# Patient Record
Sex: FEMALE | Race: WHITE | Employment: PART TIME | URBAN - METROPOLITAN AREA
[De-identification: names, ages, dates, MRNs, and addresses within clinical notes are randomized per-mention and may not be internally consistent; named-entity substitution may affect disease eponyms.]

---

## 2017-01-12 ENCOUNTER — HOSPITAL ENCOUNTER (OUTPATIENT)
Age: 37
Discharge: HOME | End: 2017-01-12
Attending: EMERGENCY MEDICINE
Payer: MEDICAID

## 2017-01-12 VITALS
DIASTOLIC BLOOD PRESSURE: 86 MMHG | RESPIRATION RATE: 19 BRPM | BODY MASS INDEX: 33.13 KG/M2 | OXYGEN SATURATION: 98 % | SYSTOLIC BLOOD PRESSURE: 100 MMHG | TEMPERATURE: 98 F | HEIGHT: 62 IN | HEART RATE: 119 BPM | WEIGHT: 180 LBS

## 2017-01-12 DIAGNOSIS — H66.002 ACUTE SUPPURATIVE OTITIS MEDIA OF LEFT EAR WITHOUT SPONTANEOUS RUPTURE OF TYMPANIC MEMBRANE, RECURRENCE NOT SPECIFIED: Primary | ICD-10-CM

## 2017-01-12 PROCEDURE — 99214 OFFICE O/P EST MOD 30 MIN: CPT

## 2017-01-12 PROCEDURE — 99213 OFFICE O/P EST LOW 20 MIN: CPT

## 2017-01-12 RX ORDER — AMOXICILLIN AND CLAVULANATE POTASSIUM 875; 125 MG/1; MG/1
1 TABLET, FILM COATED ORAL 2 TIMES DAILY
Qty: 20 TABLET | Refills: 0 | Status: SHIPPED | OUTPATIENT
Start: 2017-01-12 | End: 2017-01-22

## 2017-01-12 RX ORDER — PREDNISONE 20 MG/1
40 TABLET ORAL DAILY
Qty: 6 TABLET | Refills: 0 | Status: SHIPPED | OUTPATIENT
Start: 2017-01-12 | End: 2017-01-15

## 2017-01-12 NOTE — ED PROVIDER NOTES
[facility]    History   Patient presents with:  Cough/URI  Ear Problem Pain (neurosensory)    Stated Complaint: cough    HPI    Patient is a 26-year-old female with a history of lupus who is not on immune Hypertension Mother    • Hypertension Paternal Grandmother    • Glaucoma Neg      family h/o   • Macular degeneration Neg      family h/o   • Cancer Paternal Uncle      Leukemia   • Hypertension Father    • Skin cancer Paternal Grandmother          Smoking specified  (primary encounter diagnosis)    Disposition:  Discharge    Follow-up:  Yadira Issa, [address] [facility] [address]  [phone]    Schedule an appointment as soon as possible for a visit in 1 week  For Recheck      Medications Pr

## 2017-01-19 ENCOUNTER — HOSPITAL ENCOUNTER (OUTPATIENT)
Age: 37
Discharge: HOME | End: 2017-01-19
Payer: MEDICAID

## 2017-01-19 VITALS
RESPIRATION RATE: 20 BRPM | SYSTOLIC BLOOD PRESSURE: 117 MMHG | BODY MASS INDEX: 32.2 KG/M2 | TEMPERATURE: 98 F | WEIGHT: 175 LBS | DIASTOLIC BLOOD PRESSURE: 44 MMHG | HEART RATE: 74 BPM | HEIGHT: 62 IN | OXYGEN SATURATION: 100 %

## 2017-01-19 DIAGNOSIS — R42 DIZZINESS: ICD-10-CM

## 2017-01-19 DIAGNOSIS — R42 VERTIGO: Primary | ICD-10-CM

## 2017-01-19 PROCEDURE — 99213 OFFICE O/P EST LOW 20 MIN: CPT

## 2017-01-19 PROCEDURE — 99214 OFFICE O/P EST MOD 30 MIN: CPT

## 2017-01-19 RX ORDER — MECLIZINE HYDROCHLORIDE 25 MG/1
25 TABLET ORAL 3 TIMES DAILY PRN
Qty: 15 TABLET | Refills: 0 | Status: SHIPPED | OUTPATIENT
Start: 2017-01-19 | End: 2017-01-24

## 2017-01-19 RX ORDER — MECLIZINE HYDROCHLORIDE 25 MG/1
25 TABLET ORAL ONCE
Status: COMPLETED | OUTPATIENT
Start: 2017-01-19 | End: 2017-01-19

## 2017-01-19 NOTE — ED NOTES
Patient will be discharged home with prescription for Antivert. Patient instructed to go to ER if symptoms become worse. Patient stable and ambulatory independently at time of discharge.

## 2017-01-19 NOTE — ED PROVIDER NOTES
Patient presents with:  Dizziness      HPI:     Andrew Morris is a 43year old female presents with a chief complaint of dizziness that started when she woke up this morning. She does have a history of fibromyalgia, lupus, and migraines.   She state Grandmother    • Glaucoma Neg      family h/o   • Macular degeneration Neg      family h/o   • Cancer Paternal Uncle      Leukemia   • Hypertension Father    • Skin cancer Paternal Grandmother          ROS:     Positive for stated complaint: dizziness  All Dizziness R42        All results reviewed and discussed with patient. See AVS for detailed discharge instructions for your condition today.     Please follow up with:  Mary Oropeza MD  . [facility] [address] [phone]    Schedule an a

## 2017-01-19 NOTE — ED INITIAL ASSESSMENT (HPI)
Patient woke up approximately 6 am feeling dizzy. Thought it would pass, but she has continued to feel dizzy all day despite eating, drinking, resting.

## 2017-01-24 ENCOUNTER — OFFICE VISIT (OUTPATIENT)
Dept: FAMILY MEDICINE CLINIC | Facility: CLINIC | Age: 37
End: 2017-01-24

## 2017-01-24 VITALS
BODY MASS INDEX: 34 KG/M2 | TEMPERATURE: 98 F | WEIGHT: 187 LBS | HEART RATE: 89 BPM | SYSTOLIC BLOOD PRESSURE: 133 MMHG | DIASTOLIC BLOOD PRESSURE: 80 MMHG

## 2017-01-24 DIAGNOSIS — H81.20 VESTIBULAR NEURITIS, UNSPECIFIED LATERALITY: Primary | ICD-10-CM

## 2017-01-24 DIAGNOSIS — R05.9 COUGH: ICD-10-CM

## 2017-01-24 PROCEDURE — 99212 OFFICE O/P EST SF 10 MIN: CPT | Performed by: FAMILY MEDICINE

## 2017-01-24 PROCEDURE — 99213 OFFICE O/P EST LOW 20 MIN: CPT | Performed by: FAMILY MEDICINE

## 2017-01-24 RX ORDER — METHYLPREDNISOLONE 4 MG/1
TABLET ORAL
Qty: 1 KIT | Refills: 1 | Status: SHIPPED | OUTPATIENT
Start: 2017-01-24 | End: 2017-07-05 | Stop reason: ALTCHOICE

## 2017-01-24 RX ORDER — AMOXICILLIN 875 MG/1
875 TABLET, COATED ORAL
COMMUNITY
Start: 2016-09-15 | End: 2017-01-24 | Stop reason: ALTCHOICE

## 2017-01-24 RX ORDER — AMOXICILLIN AND CLAVULANATE POTASSIUM 875; 125 MG/1; MG/1
1 TABLET, FILM COATED ORAL
COMMUNITY
Start: 2016-04-01 | End: 2017-01-24 | Stop reason: ALTCHOICE

## 2017-01-24 RX ORDER — GUAIFENESIN AND CODEINE PHOSPHATE 100; 10 MG/5ML; MG/5ML
SOLUTION ORAL
COMMUNITY
Start: 2016-09-15 | End: 2017-01-24 | Stop reason: ALTCHOICE

## 2017-01-24 NOTE — PROGRESS NOTES
Blood pressure 133/80, pulse 89, temperature 98 °F (36.7 °C), temperature source Oral, weight 187 lb (84.823 kg), last menstrual period 12/25/2016, currently breastfeeding. PATIENT COMPLAINING OF 5 DAYS OF SEVERE DIZZINESS. CURRENTLY ON MENSES.  NO HEARIN

## 2017-06-29 ENCOUNTER — APPOINTMENT (OUTPATIENT)
Dept: CT IMAGING | Facility: HOSPITAL | Age: 37
End: 2017-06-29
Attending: EMERGENCY MEDICINE
Payer: MEDICAID

## 2017-06-29 ENCOUNTER — HOSPITAL ENCOUNTER (EMERGENCY)
Facility: HOSPITAL | Age: 37
Discharge: HOME | End: 2017-06-29
Attending: EMERGENCY MEDICINE
Payer: MEDICAID

## 2017-06-29 VITALS
HEIGHT: 63 IN | HEART RATE: 75 BPM | WEIGHT: 170 LBS | BODY MASS INDEX: 30.12 KG/M2 | OXYGEN SATURATION: 98 % | TEMPERATURE: 99 F | RESPIRATION RATE: 16 BRPM | DIASTOLIC BLOOD PRESSURE: 74 MMHG | SYSTOLIC BLOOD PRESSURE: 128 MMHG

## 2017-06-29 DIAGNOSIS — G43.809 OTHER TYPE OF NONINTRACTABLE MIGRAINE: Primary | ICD-10-CM

## 2017-06-29 PROCEDURE — 81025 URINE PREGNANCY TEST: CPT

## 2017-06-29 PROCEDURE — 96375 TX/PRO/DX INJ NEW DRUG ADDON: CPT

## 2017-06-29 PROCEDURE — 99284 EMERGENCY DEPT VISIT MOD MDM: CPT

## 2017-06-29 PROCEDURE — 70450 CT HEAD/BRAIN W/O DYE: CPT | Performed by: EMERGENCY MEDICINE

## 2017-06-29 PROCEDURE — 96374 THER/PROPH/DIAG INJ IV PUSH: CPT

## 2017-06-29 RX ORDER — KETOROLAC TROMETHAMINE 15 MG/ML
15 INJECTION, SOLUTION INTRAMUSCULAR; INTRAVENOUS ONCE
Status: COMPLETED | OUTPATIENT
Start: 2017-06-29 | End: 2017-06-29

## 2017-06-29 RX ORDER — METOCLOPRAMIDE HYDROCHLORIDE 5 MG/ML
5 INJECTION INTRAMUSCULAR; INTRAVENOUS ONCE
Status: COMPLETED | OUTPATIENT
Start: 2017-06-29 | End: 2017-06-29

## 2017-06-30 NOTE — ED PROVIDER NOTES
Patient Seen in: [facility] AND [facility] Emergency Department    History   Patient presents with:  Dizziness (neurologic)    Stated Complaint: dizziness/lightheaded, headache    HPI    Patient is a 76-year-old female who presents to the emergency department wi M aunt   • Hypertension Mother    • Hypertension Paternal Grandmother    • Glaucoma Neg      family h/o   • Macular degeneration Neg      family h/o   • Cancer Paternal Uncle      Leukemia   • Hypertension Father    • Skin cancer Paternal Grandmother POCT PREGNANCY URINE - Normal       ============================================================  ED Course  ------------------------------------------------------------  MDM     Patient symptoms improved with IV Toradol and Reglan.   CT scan normal.  Suspe

## 2017-07-05 ENCOUNTER — OFFICE VISIT (OUTPATIENT)
Dept: FAMILY MEDICINE CLINIC | Facility: CLINIC | Age: 37
End: 2017-07-05

## 2017-07-05 VITALS
DIASTOLIC BLOOD PRESSURE: 77 MMHG | HEART RATE: 78 BPM | SYSTOLIC BLOOD PRESSURE: 133 MMHG | WEIGHT: 191 LBS | BODY MASS INDEX: 34 KG/M2 | TEMPERATURE: 98 F

## 2017-07-05 DIAGNOSIS — H57.11 EYE PAIN, RIGHT: Primary | ICD-10-CM

## 2017-07-05 PROCEDURE — 99213 OFFICE O/P EST LOW 20 MIN: CPT | Performed by: FAMILY MEDICINE

## 2017-07-05 PROCEDURE — 99212 OFFICE O/P EST SF 10 MIN: CPT | Performed by: FAMILY MEDICINE

## 2017-07-05 RX ORDER — SERTRALINE HYDROCHLORIDE 100 MG/1
TABLET, FILM COATED ORAL
COMMUNITY
End: 2017-07-05 | Stop reason: ALTCHOICE

## 2017-07-05 NOTE — PROGRESS NOTES
Blood pressure 133/77, pulse 78, temperature 98.4 °F (36.9 °C), temperature source Oral, weight 191 lb (86.6 kg), last menstrual period 06/22/2017, currently breastfeeding. Patient presents today following up for headache that began 9 days ago.   Has a h

## 2017-07-06 ENCOUNTER — TELEPHONE (OUTPATIENT)
Dept: FAMILY MEDICINE CLINIC | Facility: CLINIC | Age: 37
End: 2017-07-06

## 2017-07-06 ENCOUNTER — OFFICE VISIT (OUTPATIENT)
Dept: OPHTHALMOLOGY | Facility: CLINIC | Age: 37
End: 2017-07-06

## 2017-07-06 DIAGNOSIS — H57.11 PAIN IN RIGHT EYE: Primary | ICD-10-CM

## 2017-07-06 DIAGNOSIS — R51.9 NONINTRACTABLE EPISODIC HEADACHE, UNSPECIFIED HEADACHE TYPE: Primary | ICD-10-CM

## 2017-07-06 PROCEDURE — 99243 OFF/OP CNSLTJ NEW/EST LOW 30: CPT | Performed by: OPHTHALMOLOGY

## 2017-07-06 PROCEDURE — 99212 OFFICE O/P EST SF 10 MIN: CPT | Performed by: OPHTHALMOLOGY

## 2017-07-06 NOTE — TELEPHONE ENCOUNTER
Patient following up with doctor after seeing ophthalmologist.   No findings, everything clear no inflammation or infection. Still having pain and pressure right eye.-  Suggested patient f/u with neurology.   Patient asking for call back

## 2017-07-06 NOTE — PROGRESS NOTES
Ta Matthew is a 42year old female. HPI:     HPI     Consult    Additional comments: Referred by David Root MD           Comments   Patient is here for an evaluation of eye pain and pressure behind the right eye.   Patient was seen at [facility] E use: No                Medications:    No current outpatient prescriptions on file.     Allergies:    Latex                   Rash    Comment:Difficulty breathing  Sulfa Antibiotics           ROS:     ROS     Positive for: Eyes    Negative for: Constitution in the eyes. Refer patient back to Dr. Zacarias Gao and possible referral to neurologist if symptoms persist.    Patient had a negative CT scan of the brain. No orders of the defined types were placed in this encounter.       Meds This Visit:    No pre

## 2017-07-06 NOTE — PATIENT INSTRUCTIONS
Pain in right eye   No cause found for right eye pain. Reassured patient that there is no infection, inflammation, foreign body, abrasion or increased pressure in the eyes.    Refer patient back to Dr. Enedina Beal and possible referral to neurologist if sympt

## 2017-08-07 ENCOUNTER — HOSPITAL ENCOUNTER (OUTPATIENT)
Facility: HOSPITAL | Age: 37
Setting detail: OBSERVATION
Discharge: HOME | End: 2017-08-08
Attending: EMERGENCY MEDICINE | Admitting: HOSPITALIST
Payer: COMMERCIAL

## 2017-08-07 ENCOUNTER — TELEPHONE (OUTPATIENT)
Dept: INTERNAL MEDICINE CLINIC | Facility: CLINIC | Age: 37
End: 2017-08-07

## 2017-08-07 DIAGNOSIS — K92.2 GI BLEED: ICD-10-CM

## 2017-08-07 DIAGNOSIS — K92.2 GASTROINTESTINAL HEMORRHAGE, UNSPECIFIED GASTROINTESTINAL HEMORRHAGE TYPE: ICD-10-CM

## 2017-08-07 DIAGNOSIS — K20.90 ESOPHAGITIS: ICD-10-CM

## 2017-08-07 DIAGNOSIS — K64.8 INTERNAL HEMORRHOIDS: ICD-10-CM

## 2017-08-07 DIAGNOSIS — K44.9 HIATAL HERNIA: ICD-10-CM

## 2017-08-07 DIAGNOSIS — K57.30 DIVERTICULOSIS OF COLON: Primary | ICD-10-CM

## 2017-08-07 LAB
ALBUMIN SERPL BCP-MCNC: 4.2 G/DL (ref 3.5–4.8)
ALP SERPL-CCNC: 75 U/L (ref 32–100)
ALT SERPL-CCNC: 45 U/L (ref 14–54)
ANION GAP SERPL CALC-SCNC: 9 MMOL/L (ref 0–18)
AST SERPL-CCNC: 33 U/L (ref 15–41)
B-HCG UR QL: NEGATIVE
BASOPHILS # BLD: 0 K/UL (ref 0–0.2)
BASOPHILS NFR BLD: 0 %
BILIRUB DIRECT SERPL-MCNC: 0.1 MG/DL (ref 0–0.2)
BILIRUB SERPL-MCNC: 0.5 MG/DL (ref 0.3–1.2)
BUN SERPL-MCNC: 7 MG/DL (ref 8–20)
BUN/CREAT SERPL: 10 (ref 10–20)
CALCIUM SERPL-MCNC: 9.2 MG/DL (ref 8.5–10.5)
CHLORIDE SERPL-SCNC: 103 MMOL/L (ref 95–110)
CO2 SERPL-SCNC: 26 MMOL/L (ref 22–32)
CREAT SERPL-MCNC: 0.7 MG/DL (ref 0.5–1.5)
EOSINOPHIL # BLD: 0 K/UL (ref 0–0.7)
EOSINOPHIL NFR BLD: 1 %
ERYTHROCYTE [DISTWIDTH] IN BLOOD BY AUTOMATED COUNT: 14.1 % (ref 11–15)
GLUCOSE SERPL-MCNC: 93 MG/DL (ref 70–99)
HCT VFR BLD AUTO: 40 % (ref 35–48)
HGB BLD-MCNC: 13.3 G/DL (ref 12–16)
LYMPHOCYTES # BLD: 1.7 K/UL (ref 1–4)
LYMPHOCYTES NFR BLD: 21 %
MCH RBC QN AUTO: 31.3 PG (ref 27–32)
MCHC RBC AUTO-ENTMCNC: 33.2 G/DL (ref 32–37)
MCV RBC AUTO: 94.4 FL (ref 80–100)
MONOCYTES # BLD: 0.5 K/UL (ref 0–1)
MONOCYTES NFR BLD: 7 %
NEUTROPHILS # BLD AUTO: 5.8 K/UL (ref 1.8–7.7)
NEUTROPHILS NFR BLD: 71 %
OSMOLALITY UR CALC.SUM OF ELEC: 284 MOSM/KG (ref 275–295)
PLATELET # BLD AUTO: 208 K/UL (ref 140–400)
PMV BLD AUTO: 9.3 FL (ref 7.4–10.3)
POTASSIUM SERPL-SCNC: 3.5 MMOL/L (ref 3.3–5.1)
PROT SERPL-MCNC: 7.5 G/DL (ref 5.9–8.4)
RBC # BLD AUTO: 4.24 M/UL (ref 3.7–5.4)
SODIUM SERPL-SCNC: 138 MMOL/L (ref 136–144)
WBC # BLD AUTO: 8.1 K/UL (ref 4–11)

## 2017-08-07 PROCEDURE — 99220 INITIAL OBSERVATION CARE,LEVL III: CPT | Performed by: HOSPITALIST

## 2017-08-07 RX ORDER — 0.9 % SODIUM CHLORIDE 0.9 %
VIAL (ML) INJECTION
Status: DISPENSED
Start: 2017-08-07 | End: 2017-08-08

## 2017-08-07 RX ORDER — ONDANSETRON 2 MG/ML
INJECTION INTRAMUSCULAR; INTRAVENOUS
Status: DISCONTINUED
Start: 2017-08-07 | End: 2017-08-07 | Stop reason: WASHOUT

## 2017-08-07 RX ORDER — ONDANSETRON 2 MG/ML
4 INJECTION INTRAMUSCULAR; INTRAVENOUS EVERY 6 HOURS PRN
Status: DISCONTINUED | OUTPATIENT
Start: 2017-08-07 | End: 2017-08-08

## 2017-08-07 RX ORDER — SODIUM CHLORIDE 9 MG/ML
INJECTION, SOLUTION INTRAVENOUS CONTINUOUS
Status: DISCONTINUED | OUTPATIENT
Start: 2017-08-07 | End: 2017-08-08

## 2017-08-07 RX ORDER — POTASSIUM CHLORIDE 20 MEQ/1
40 TABLET, EXTENDED RELEASE ORAL EVERY 4 HOURS
Status: DISPENSED | OUTPATIENT
Start: 2017-08-07 | End: 2017-08-08

## 2017-08-07 RX ORDER — ACETAMINOPHEN 325 MG/1
650 TABLET ORAL EVERY 6 HOURS PRN
Status: DISCONTINUED | OUTPATIENT
Start: 2017-08-07 | End: 2017-08-08

## 2017-08-07 RX ORDER — SODIUM CHLORIDE 0.9 % (FLUSH) 0.9 %
3 SYRINGE (ML) INJECTION AS NEEDED
Status: DISCONTINUED | OUTPATIENT
Start: 2017-08-07 | End: 2017-08-08

## 2017-08-07 NOTE — ED PROVIDER NOTES
Patient Seen in: [facility] AND [facility] Emergency Department    History   No chief complaint on file.     Stated Complaint: vomiting/diarrhea/blood    HPI    75-year-old female who does not take blood thinners without a history of gastritis or ulcers with this HPI.  Constitutional and vital signs reviewed. All other systems reviewed and negative except as noted above. PSFH elements reviewed from today and agreed except as otherwise stated in HPI.     Physical Exam   ED Triage Vitals [08/07/17 1550]  BP: 1 ------                     CBC W/ DIFFERENTIAL[[id number]]          Normal              Final result                 Please view results for these tests on the individual orders.    RAINBOW DRAW BLUE   RAINBOW DRAW GOLD   RAINBOW DRAW LAVENDER   RAINBOW DRAW

## 2017-08-07 NOTE — ED INITIAL ASSESSMENT (HPI)
Pt presents with coffee ground emesis, bloody diarrhea, lower abdominal cramping, and dizziness since 1500 today. Hx of diverticulitis.

## 2017-08-07 NOTE — TELEPHONE ENCOUNTER
Actions Requested: advised ER ASAP. Problem: rectal bleeding  Onset and Timing: today  Associated Symptoms: this morning pt states that she felt \" a little off. \"  Shortly thereafter she had a very large bowel movement which \"contained a lot of bright r

## 2017-08-07 NOTE — ED NOTES
Pt to ER with  with c/o bright red blood in diarrhea and \"coffee ground\" emesis x1 around 1515 today. Pt denies chest pain or sob. +cramping pain with diarrhea today. Pt afebrile at home. Pt denies daily ETOH use. Pt denies smoking.  Pt c/o mild di

## 2017-08-08 ENCOUNTER — SURGERY (OUTPATIENT)
Age: 37
End: 2017-08-08

## 2017-08-08 ENCOUNTER — ANESTHESIA EVENT (OUTPATIENT)
Dept: ENDOSCOPY | Facility: HOSPITAL | Age: 37
End: 2017-08-08
Payer: COMMERCIAL

## 2017-08-08 ENCOUNTER — ANESTHESIA (OUTPATIENT)
Dept: ENDOSCOPY | Facility: HOSPITAL | Age: 37
End: 2017-08-08
Payer: COMMERCIAL

## 2017-08-08 VITALS
HEART RATE: 79 BPM | OXYGEN SATURATION: 100 % | TEMPERATURE: 98 F | SYSTOLIC BLOOD PRESSURE: 133 MMHG | WEIGHT: 180 LBS | RESPIRATION RATE: 18 BRPM | DIASTOLIC BLOOD PRESSURE: 73 MMHG | HEIGHT: 62 IN | BODY MASS INDEX: 33.13 KG/M2

## 2017-08-08 LAB
ANION GAP SERPL CALC-SCNC: 4 MMOL/L (ref 0–18)
BASOPHILS # BLD: 0 K/UL (ref 0–0.2)
BASOPHILS NFR BLD: 0 %
BUN SERPL-MCNC: 5 MG/DL (ref 8–20)
BUN/CREAT SERPL: 6.1 (ref 10–20)
CALCIUM SERPL-MCNC: 8.1 MG/DL (ref 8.5–10.5)
CHLORIDE SERPL-SCNC: 110 MMOL/L (ref 95–110)
CO2 SERPL-SCNC: 26 MMOL/L (ref 22–32)
CREAT SERPL-MCNC: 0.82 MG/DL (ref 0.5–1.5)
EOSINOPHIL # BLD: 0.1 K/UL (ref 0–0.7)
EOSINOPHIL NFR BLD: 1 %
ERYTHROCYTE [DISTWIDTH] IN BLOOD BY AUTOMATED COUNT: 13.9 % (ref 11–15)
GLUCOSE SERPL-MCNC: 92 MG/DL (ref 70–99)
HCT VFR BLD AUTO: 36.2 % (ref 35–48)
HGB BLD-MCNC: 12.1 G/DL (ref 12–16)
LYMPHOCYTES # BLD: 2.2 K/UL (ref 1–4)
LYMPHOCYTES NFR BLD: 35 %
MCH RBC QN AUTO: 31.5 PG (ref 27–32)
MCHC RBC AUTO-ENTMCNC: 33.5 G/DL (ref 32–37)
MCV RBC AUTO: 94 FL (ref 80–100)
MONOCYTES # BLD: 0.5 K/UL (ref 0–1)
MONOCYTES NFR BLD: 8 %
NEUTROPHILS # BLD AUTO: 3.6 K/UL (ref 1.8–7.7)
NEUTROPHILS NFR BLD: 56 %
OSMOLALITY UR CALC.SUM OF ELEC: 287 MOSM/KG (ref 275–295)
PLATELET # BLD AUTO: 184 K/UL (ref 140–400)
PMV BLD AUTO: 9.5 FL (ref 7.4–10.3)
POTASSIUM SERPL-SCNC: 3.9 MMOL/L (ref 3.3–5.1)
POTASSIUM SERPL-SCNC: 3.9 MMOL/L (ref 3.3–5.1)
RBC # BLD AUTO: 3.85 M/UL (ref 3.7–5.4)
SODIUM SERPL-SCNC: 140 MMOL/L (ref 136–144)
WBC # BLD AUTO: 6.4 K/UL (ref 4–11)

## 2017-08-08 PROCEDURE — 0DJD8ZZ INSPECTION OF LOWER INTESTINAL TRACT, VIA NATURAL OR ARTIFICIAL OPENING ENDOSCOPIC: ICD-10-PCS | Performed by: INTERNAL MEDICINE

## 2017-08-08 PROCEDURE — 43239 EGD BIOPSY SINGLE/MULTIPLE: CPT | Performed by: INTERNAL MEDICINE

## 2017-08-08 PROCEDURE — 99217 OBSERVATION CARE DISCHARGE: CPT | Performed by: HOSPITALIST

## 2017-08-08 PROCEDURE — 45378 DIAGNOSTIC COLONOSCOPY: CPT | Performed by: INTERNAL MEDICINE

## 2017-08-08 PROCEDURE — 0DB48ZX EXCISION OF ESOPHAGOGASTRIC JUNCTION, VIA NATURAL OR ARTIFICIAL OPENING ENDOSCOPIC, DIAGNOSTIC: ICD-10-PCS | Performed by: INTERNAL MEDICINE

## 2017-08-08 RX ORDER — NALOXONE HYDROCHLORIDE 0.4 MG/ML
80 INJECTION, SOLUTION INTRAMUSCULAR; INTRAVENOUS; SUBCUTANEOUS AS NEEDED
Status: DISCONTINUED | OUTPATIENT
Start: 2017-08-08 | End: 2017-08-08

## 2017-08-08 RX ORDER — SODIUM CHLORIDE, SODIUM LACTATE, POTASSIUM CHLORIDE, CALCIUM CHLORIDE 600; 310; 30; 20 MG/100ML; MG/100ML; MG/100ML; MG/100ML
INJECTION, SOLUTION INTRAVENOUS CONTINUOUS PRN
Status: DISCONTINUED | OUTPATIENT
Start: 2017-08-08 | End: 2017-08-08 | Stop reason: SURG

## 2017-08-08 RX ORDER — MAGNESIUM CARB/ALUMINUM HYDROX 105-160MG
296 TABLET,CHEWABLE ORAL ONCE
Status: COMPLETED | OUTPATIENT
Start: 2017-08-08 | End: 2017-08-08

## 2017-08-08 RX ORDER — PANTOPRAZOLE SODIUM 40 MG/1
40 TABLET, DELAYED RELEASE ORAL
Qty: 30 TABLET | Refills: 2 | Status: SHIPPED | OUTPATIENT
Start: 2017-08-08 | End: 2021-03-05

## 2017-08-08 RX ORDER — SODIUM CHLORIDE, SODIUM LACTATE, POTASSIUM CHLORIDE, CALCIUM CHLORIDE 600; 310; 30; 20 MG/100ML; MG/100ML; MG/100ML; MG/100ML
INJECTION, SOLUTION INTRAVENOUS CONTINUOUS
Status: DISCONTINUED | OUTPATIENT
Start: 2017-08-08 | End: 2017-08-08

## 2017-08-08 RX ORDER — ACETAMINOPHEN 650 MG/1
650 SUPPOSITORY RECTAL EVERY 6 HOURS PRN
Status: DISCONTINUED | OUTPATIENT
Start: 2017-08-08 | End: 2017-08-08

## 2017-08-08 RX ORDER — LIDOCAINE HYDROCHLORIDE 10 MG/ML
INJECTION, SOLUTION EPIDURAL; INFILTRATION; INTRACAUDAL; PERINEURAL AS NEEDED
Status: DISCONTINUED | OUTPATIENT
Start: 2017-08-08 | End: 2017-08-08 | Stop reason: SURG

## 2017-08-08 RX ADMIN — SODIUM CHLORIDE, SODIUM LACTATE, POTASSIUM CHLORIDE, CALCIUM CHLORIDE: 600; 310; 30; 20 INJECTION, SOLUTION INTRAVENOUS at 15:29:00

## 2017-08-08 RX ADMIN — SODIUM CHLORIDE, SODIUM LACTATE, POTASSIUM CHLORIDE, CALCIUM CHLORIDE: 600; 310; 30; 20 INJECTION, SOLUTION INTRAVENOUS at 16:05:00

## 2017-08-08 RX ADMIN — LIDOCAINE HYDROCHLORIDE 50 MG: 10 INJECTION, SOLUTION EPIDURAL; INFILTRATION; INTRACAUDAL; PERINEURAL at 15:30:00

## 2017-08-08 NOTE — INTERVAL H&P NOTE
Pre-op Diagnosis: GI BLEED    The above referenced H&P was reviewed by Dhiraj Perez MD on 8/8/2017, the patient was examined and no significant changes have occurred in the patient's condition since the H&P was performed.   I discussed with the jennifer

## 2017-08-08 NOTE — DISCHARGE SUMMARY
[facility]  Discharge Summary     Ash Comer  : 1980    Status: Observation  Day #: 0    Attending: Abdulaziz Khalil MD  PCP: Staci Torres DO     Date of Admission: 2017  Date of Discharge: 2017     [address] [place] [address] rash, no lesion         Discharge Medications      START taking these medications      Instructions Prescription details   Pantoprazole Sodium 40 MG Tbec  Commonly known as:  PROTONIX      Take 1 tablet (40 mg total) by mouth every morning before breakfast

## 2017-08-08 NOTE — PLAN OF CARE
Problem: Patient/Family Goals  Goal: Patient/Family Long Term Goal  Patient's Long Term Goal: To be discharged    Interventions:  - Monitor pt VS  - Assess pt for pain  - Ensure pt comfort  - Administer medications as ordered    - See additional Care Plan ordered  - Obtain nutritional consult as needed  - Evaluate fluid balance  Outcome: Progressing

## 2017-08-08 NOTE — ANESTHESIA PREPROCEDURE EVALUATION
Anesthesia PreOp Note    HPI:     Ta Matthew is a 42year old female who presents for preoperative consultation requested by: Hayden Fontenot MD    Date of Surgery: 8/7/2017 - 8/8/2017    Procedure(s):  COLONOSCOPY  ESOPHAGOGASTRODUODENOSCO Diverticulitis    • Diverticulosis of large intestine    • Fibromyalgia    • Fibromyalgia    • GERD (gastroesophageal reflux disease)    • Gestational diabetes    • Heart murmur    • Heart murmur    • Lipid screening 7/12/2013    per:NG   • Long-term use o • Macular degeneration Neg      family h/o       Social History  Social History   Marital status:   Spouse name: N/A    Years of education: N/A  Number of children: N/A     Occupational History  None on file     Social History Main Topics   [facility] - negative ROS    breath sounds clear to auscultation  Cardiovascular   Exercise tolerance: good    Rhythm: regular  Rate: normal  ROS comment: Heart murmur    Neuro/Psych    (+) neuromuscular disease,     GI/Hepatic/Renal    (+) GERD,   (-) live

## 2017-08-08 NOTE — PLAN OF CARE
Problem: GASTROINTESTINAL - ADULT  Goal: Minimal or absence of nausea and vomiting  INTERVENTIONS:  - Maintain adequate hydration with IV or PO as ordered and tolerated  - Evaluate effectiveness of ordered antiemetic medications  - Provide nonpharmacologic

## 2017-08-08 NOTE — CONSULTS
Gastroenterology consultation note    Reason for consultation:  Gastrointestinal bleeding      History of present illness: The patient is a 42year old female who was well until this afternoon when she noted \"an upset stomach\".   The patient subsequently Latex                   Rash    Comment:Difficulty breathing  Sulfa Antibiotics               Current Facility-Administered Medications:  Normal Saline Flush 0.9 % injection 3 mL 3 mL Intravenous PRN   0.9%  NaCl infusion  Intravenous Continuous female who is in no acute distress. Color is good. She appears somewhat flushed. HEENT: Negative for scleral icterus. Conjunctivae are pink.   Neck: Supple without adenopathy or thyromegaly  Chest: Clear to auscultation and percussion  Cardiovascular: R Will arrange tomorrow with a split dose GoLYTELY preparation and monitored anesthesia care. Recommendations:  1. Follow hemoglobin hematocrit and hemodynamics. 2.  Split dose GoLYTELY preparation.   3.  Colonoscopy and upper endoscopy tomorrow aftern

## 2017-08-08 NOTE — ANESTHESIA POSTPROCEDURE EVALUATION
Patient: Jaquelin Dick    Procedure Summary     Date:  08/08/17 Room / Location:  [address] [place] [address] ENDOSCOPY 05 / [address] [place] [address] ENDOSCOPY    Anesthesia Start:  6785 Anesthesia Stop:      Procedures:       COLONOSCOPY (N/A )      ESOPHAGOGASTRODUODENOSCOPY (EGD) (N/A ) Zunilda

## 2017-08-08 NOTE — PROGRESS NOTES
[facility]  Progress Note     Aiden Do  : 1980    Status: Observation  Day #: 0    Attending: Anastasiya Kirkland MD  PCP: Golden Umanzor.  DO Brian      Assessment and Plan     Acute GI bleed - presented with rectal bleeding and coffe (PROTONIX) IV push  40 mg Intravenous Daily   • Sodium Chloride          PRN Meds: acetaminophen, Normal Saline Flush, acetaminophen, ondansetron HCl          Syed Agudelo MD

## 2017-08-08 NOTE — OPERATIVE REPORT
[facility] Endoscopy Report      Date of Procedure:  08/08/17      Preoperative Diagnosis:  Gastrointestinal bleeding      Postoperative Diagnosis:  1. Pancolonic diverticulosis  2. Internal hemorrhoids with stigmata of bleeding  2.   TALON montelongo with an intact vascular pattern. There are scattered diverticula seen throughout the colon, some large mouthed. There were no colonic polyps, mass lesions, vascular anomalies or signs of inflammation seen.   Retroflexion in the right colon revealed no add

## 2017-08-08 NOTE — H&P
[facility]    PATIENT'S NAME: Princess Landaverde   ATTENDING PHYSICIAN: Wili Ospina MD   PATIENT ACCOUNT#:   [id number]    LOCATION:  [address] [place] [address] [address] RECORD #:   [id number]       YOB: 1980  ADMISSION DATE:       08/0 Trachea midline, full range of motion, supple, no thyromegaly or lymphadenopathy. LUNGS:  Clear to auscultation bilaterally. Normal respiratory effort. No intercostal retractions. HEART:  Regular rate and rhythm. S1, S2 auscultated. No murmur.   ABDOM

## 2017-08-09 ENCOUNTER — TELEPHONE (OUTPATIENT)
Dept: INTERNAL MEDICINE UNIT | Facility: HOSPITAL | Age: 37
End: 2017-08-09

## 2017-08-09 NOTE — TELEPHONE ENCOUNTER
Pt discharged from [facility] AND [facility] on 8/8/17 . Please call to schedule follow up with Primary Care Physician.    Thanks

## 2017-08-14 ENCOUNTER — TELEPHONE (OUTPATIENT)
Dept: GASTROENTEROLOGY | Facility: CLINIC | Age: 37
End: 2017-08-14

## 2017-08-14 NOTE — TELEPHONE ENCOUNTER
----- Message from Mayank Coreas MD sent at 8/11/2017  4:18 PM CDT -----  GI RN staff: Please send dictated lab letter to the patient's home.

## 2017-11-06 NOTE — ASSESSMENT & PLAN NOTE
No cause found for right eye pain. Reassured patient that there is no infection, inflammation, foreign body, abrasion or increased pressure in the eyes.    Refer patient back to Dr. Orourke Friend and possible referral to neurologist if symptoms persist.    Brook Vogel 06-Nov-2017 04:21

## 2021-12-22 PROBLEM — M26.609 TMJ DYSFUNCTION: Status: ACTIVE | Noted: 2021-12-22

## (undated) DEVICE — FORCEP RADIAL JAW 4

## (undated) DEVICE — ENDOSCOPY PACK - LOWER: Brand: MEDLINE INDUSTRIES, INC.

## (undated) DEVICE — ENDOSCOPY PACK UPPER: Brand: MEDLINE INDUSTRIES, INC.

## (undated) DEVICE — Device: Brand: DEFENDO AIR/WATER/SUCTION AND BIOPSY VALVE

## (undated) NOTE — MR AVS SNAPSHOT
[facility]  [address] [place] [address], [address] [place] [address]               Thank you for choosing us for your health care visit with Clarence Duverney. DO Brian.   We are glad to serve you and happy to provide you with this summary Enjoy your food, but eat less. Fully enjoy your food when eating. Don’t eat while distracted and slow down. Avoid over sized portions. Don’t eat while when you’re bored.      EAT THESE FOODS MORE OFTEN: EAT THESE FOODS LESS OFTEN:   Make half your pl

## (undated) NOTE — LETTER
[facility]  Administration of Anesthesia  1. I, Aiden Do, or _________________________________ acting on her behalf, (Patient) (Dependent/Representative) request to receive anesthesia for my pending procedure/operation/treatmen infections, high spinal block, spinal bleeding, seizure, cardiac arrest and death. 7. AWARENESS: I understand that it is possible (but unlikely) to have explicit memory of events from the operating room while under general anesthesia.   8. ELECTROCONVULSIV (Date) (Time)                                                                                               (Responsible person in case of minor/ unconscious pt) /Relationship    My signature below affirms that prior to the time of the procedure, I have ex

## (undated) NOTE — ED AVS SNAPSHOT
[facility] Emergency Department  [place]   [place] [address]  Phone:  [phone]  Fax:  [phone]          Mauriliorogelio Simeon   MRN: [id number]    Department:  [facility] Emergency Department   Date of Visit:  6/29/2017 and Class Registration line at [phone] or find a doctor online by visiting [email or web address].    IF THERE IS ANY CHANGE OR WORSENING OF YOUR CONDITION, CALL YOUR PRIMARY CARE PHYSICIAN AT ONCE OR RETURN IMMEDIATELY TO [address] [place] [address].     If

## (undated) NOTE — ED AVS SNAPSHOT
[facility] AND [facility] Immediate Care in [address] [address] Sonido Alas    Phone:  [phone]    Fax:  [phone]           aMggie Lyman   MRN: [id number]    Department:  [facility] AND [facility] Immediate Care in [address] [place] [address]   Date of Visi Discharge References/Attachments     OTITIS MEDIA, ANTIBIOTIC TREATMENT (ADULT) (ENGLISH)    SINUSITIS (ANTIBIOTIC TREATMENT) (ENGLISH)      Disclosure     Insurance plans vary and the physician(s) referred by the Immediate Care may not be covered Registration line at [phone] or find a doctor online by visiting [email or web address].    IF THERE IS ANY CHANGE OR WORSENING OF YOUR CONDITION, CALL YOUR PRIMARY CARE PHYSICIAN AT ONCE OR GO TO [address] [place] [address].     If you have been prescribed a - If you have concerns related to behavioral health issues or thoughts of harming yourself, contact [address] [place] [address] at [phone].     - If you don’t have insurance, Elizabeth Pedro has partnered with Patient [employer]

## (undated) NOTE — ED AVS SNAPSHOT
[facility] AND [facility] Immediate Care in [address] [address] [address] Alas    Phone:  [phone]    Fax:  [phone]           Remedios Banks   MRN: [id number]    Department:  [facility] AND [facility] Immediate Care in [address] [place] [address]   Date of Visi Insurance plans vary and the physician(s) referred by the Immediate Care may not be covered by your plan. It is possible that the physician may not participate in your health insurance plan.   This may result in a lower benefit level being available to yo CARE PHYSICIAN AT ONCE OR GO TO THE EMERGENCY DEPARTMENT. If you have been prescribed any medication(s), please fill your prescription right away and begin taking the medication(s) as directed.   If you believe that any of the medications or instructions - If you don’t have insurance, Elizabeth Pedro has partnered with Patient [employer] to help you get signed up for insurance coverage.   Patient [employer] [facility] [employer] is a Federal Navigator program that can help with your Affordable Care Act cover

## (undated) NOTE — LETTER
July 6, 2017    Jolene Domingo DO  [address]     Patient: Flynn Winston   YOB: 1980   Date of Visit: 7/6/2017       Dear Dr. Bethany Kirby DO:    Thank you for referring Beryl Billingsleying to me for • Hypertension Mother    • Cancer Paternal Uncle      Leukemia   • Glaucoma Neg      family h/o   • Macular degeneration Neg      family h/o       Social History: Smoking status: Never Smoker                                                              Smo Sphere Cylinder Axis    Right -2.75 +0.75 075    Left -2.25 Sphere     Type:  Single vision                 ASSESSMENT/PLAN:     Diagnoses and Plan:     Pain in right eye   No cause found for right eye pain.   Reassured patient that there is no infection